# Patient Record
Sex: MALE | Race: WHITE | NOT HISPANIC OR LATINO | URBAN - METROPOLITAN AREA
[De-identification: names, ages, dates, MRNs, and addresses within clinical notes are randomized per-mention and may not be internally consistent; named-entity substitution may affect disease eponyms.]

---

## 2019-08-23 ENCOUNTER — HOSPITAL ENCOUNTER (EMERGENCY)
Facility: HOSPITAL | Age: 39
Discharge: HOME | End: 2019-08-23
Attending: EMERGENCY MEDICINE
Payer: COMMERCIAL

## 2019-08-23 VITALS
SYSTOLIC BLOOD PRESSURE: 133 MMHG | OXYGEN SATURATION: 97 % | RESPIRATION RATE: 16 BRPM | DIASTOLIC BLOOD PRESSURE: 77 MMHG | HEART RATE: 100 BPM | TEMPERATURE: 98 F | HEIGHT: 68 IN | BODY MASS INDEX: 30.31 KG/M2 | WEIGHT: 200 LBS

## 2019-08-23 DIAGNOSIS — S76.111A QUADRICEPS MUSCLE STRAIN, RIGHT, INITIAL ENCOUNTER: Primary | ICD-10-CM

## 2019-08-23 PROCEDURE — 99283 EMERGENCY DEPT VISIT LOW MDM: CPT

## 2019-08-23 PROCEDURE — 25000003 PHARM REV CODE 250: Performed by: PHYSICIAN ASSISTANT

## 2019-08-23 RX ORDER — MELOXICAM 7.5 MG/1
7.5 TABLET ORAL DAILY
Qty: 12 TABLET | Refills: 0 | Status: SHIPPED | OUTPATIENT
Start: 2019-08-23

## 2019-08-23 RX ORDER — IBUPROFEN 600 MG/1
600 TABLET ORAL
Status: COMPLETED | OUTPATIENT
Start: 2019-08-23 | End: 2019-08-23

## 2019-08-23 RX ORDER — ACETAMINOPHEN 500 MG
1000 TABLET ORAL
Status: COMPLETED | OUTPATIENT
Start: 2019-08-23 | End: 2019-08-23

## 2019-08-23 RX ADMIN — IBUPROFEN 600 MG: 600 TABLET, FILM COATED ORAL at 08:08

## 2019-08-23 RX ADMIN — ACETAMINOPHEN 1000 MG: 500 TABLET ORAL at 08:08

## 2019-08-24 NOTE — ED TRIAGE NOTES
Pt. reports he was reaching for an item and started having pain to his left knee area. Pt. Reports his pain is 4/10, denies taking any medication for pain.

## 2019-08-24 NOTE — ED PROVIDER NOTES
Encounter Date: 8/23/2019    SCRIBE #1 NOTE: I, Kari Jhony, am scribing for, and in the presence of,  Tyrone Randall PA-C. I have scribed the entire note.       History     Chief Complaint   Patient presents with    Knee Pain     pt states he has pain in area right at knee / femur. stated started about one hour ago and denies any injury or unusal activity     CC: right knee pain    HPI:  This is a 39 y.o. male with no pertinent PMHx who presents to the Emergency Department with a cc of mild burning right knee pain for 1 hour. The patient states the pain began suddenly as he bent forward to reach some boxes. His pain is gradually improving. Pt denies vomiting, diarrhea, or dark urine. He denies fall or trauma. He reports no worsening or alleviating factors. Patient reports no prior history of similar symptoms.     The history is provided by the patient.     Review of patient's allergies indicates:  No Known Allergies  History reviewed. No pertinent past medical history.  History reviewed. No pertinent surgical history.  History reviewed. No pertinent family history.  Social History     Tobacco Use    Smoking status: Current Every Day Smoker     Packs/day: 0.50     Types: Cigarettes    Smokeless tobacco: Never Used   Substance Use Topics    Alcohol use: No    Drug use: Never     Review of Systems   Constitutional: Negative for fever.   HENT: Negative for sore throat.    Respiratory: Negative for shortness of breath.    Cardiovascular: Negative for chest pain.   Gastrointestinal: Negative for nausea.   Genitourinary: Negative for dysuria.   Musculoskeletal: Negative for back pain.        Positive for right knee pain.   Skin: Negative for rash.   Neurological: Negative for weakness.   Hematological: Does not bruise/bleed easily.   All other systems reviewed and are negative.      Physical Exam     Initial Vitals [08/23/19 1940]   BP Pulse Resp Temp SpO2   133/77 100 16 98.2 °F (36.8 °C) 97 %      MAP       --          Physical Exam    Nursing note and vitals reviewed.  Constitutional: He appears well-developed and well-nourished. He is not diaphoretic. No distress.   HENT:   Head: Normocephalic and atraumatic.   Nose: Nose normal.   Eyes: Conjunctivae and EOM are normal. Pupils are equal, round, and reactive to light. Right eye exhibits no discharge. Left eye exhibits no discharge.   Neck: Normal range of motion. No tracheal deviation present. No JVD present.   Cardiovascular: Normal rate, regular rhythm and normal heart sounds. Exam reveals no friction rub.    No murmur heard.  Pulmonary/Chest: Breath sounds normal. No stridor. No respiratory distress. He has no wheezes. He has no rhonchi. He has no rales. He exhibits no tenderness.   Musculoskeletal: Normal range of motion.   There is no tenderness, erythema, swelling, laxity, or gross bony deformity to right lower extremity.  Full ROM of all joints right lower extremity.  Distal lower extremity pulses 2+ and equal.  Sensation intact and equal. Fully bears weight on the right lower extremity and ambulates without limp or pain.  Jumps up and down without pain. No popliteal space or calf tenderness.  No hip tenderness.   Neurological: He is alert and oriented to person, place, and time.   Skin: Skin is warm and dry. No rash and no abscess noted. No erythema. No pallor.         ED Course   Procedures  Labs Reviewed - No data to display       Imaging Results    None          Medical Decision Making:   History:   Old Medical Records: I decided to obtain old medical records.  Initial Assessment:   39-year-old male with atraumatic right knee pain  ED Management:  Patient likely had a charley horse.  No history of trauma or bony tenderness to suggest acute fracture/dislocation.  No septic joint or neurovascular compromise.  I carefully considered but doubt DVT.  Does not endorse symptoms concerning for rhabdomyolysis at this time.  Sent home with reassurance and supportive care.   Strict return precautions discussed with patient.  Patient agreeable to plan.            Scribe Attestation:   Scribe #1: I performed the above scribed service and the documentation accurately describes the services I performed. I attest to the accuracy of the note.               Clinical Impression:     1. Quadriceps muscle strain, right, initial encounter               Scribe attestation: I, Tyrone Randall PA-C, personally performed the services described in this documentation. All medical record entries made by the scribe were at my direction and in my presence.  I have reviewed the chart and agree that the record reflects my personal performance and is accurate and complete.                      Tyrone Randall PA-C  08/23/19 2030